# Patient Record
Sex: MALE | Race: OTHER
[De-identification: names, ages, dates, MRNs, and addresses within clinical notes are randomized per-mention and may not be internally consistent; named-entity substitution may affect disease eponyms.]

---

## 2019-12-22 ENCOUNTER — HOSPITAL ENCOUNTER (EMERGENCY)
Dept: HOSPITAL 54 - ER | Age: 29
Discharge: HOME | End: 2019-12-22
Payer: COMMERCIAL

## 2019-12-22 VITALS — SYSTOLIC BLOOD PRESSURE: 142 MMHG | DIASTOLIC BLOOD PRESSURE: 75 MMHG

## 2019-12-22 VITALS — HEIGHT: 61 IN | BODY MASS INDEX: 41.16 KG/M2 | WEIGHT: 218 LBS

## 2019-12-22 DIAGNOSIS — Z90.89: ICD-10-CM

## 2019-12-22 DIAGNOSIS — E86.0: ICD-10-CM

## 2019-12-22 DIAGNOSIS — F19.10: ICD-10-CM

## 2019-12-22 DIAGNOSIS — R07.89: Primary | ICD-10-CM

## 2019-12-22 DIAGNOSIS — F17.200: ICD-10-CM

## 2019-12-22 LAB
ALBUMIN SERPL BCP-MCNC: 3.9 G/DL (ref 3.4–5)
ALP SERPL-CCNC: 93 U/L (ref 46–116)
ALT SERPL W P-5'-P-CCNC: 40 U/L (ref 12–78)
AST SERPL W P-5'-P-CCNC: 21 U/L (ref 15–37)
BASOPHILS # BLD AUTO: 0 /CMM (ref 0–0.2)
BASOPHILS NFR BLD AUTO: 0.5 % (ref 0–2)
BILIRUB SERPL-MCNC: 0.9 MG/DL (ref 0.2–1)
BUN SERPL-MCNC: 15 MG/DL (ref 7–18)
CALCIUM SERPL-MCNC: 9 MG/DL (ref 8.5–10.1)
CHLORIDE SERPL-SCNC: 105 MMOL/L (ref 98–107)
CK SERPL-CCNC: 158 U/L (ref 39–308)
CO2 SERPL-SCNC: 26 MMOL/L (ref 21–32)
CREAT SERPL-MCNC: 1.2 MG/DL (ref 0.6–1.3)
EOSINOPHIL NFR BLD AUTO: 3.9 % (ref 0–6)
ETHANOL SERPL-MCNC: < 3 MG/DL (ref 0–0)
GLUCOSE SERPL-MCNC: 93 MG/DL (ref 74–106)
HCT VFR BLD AUTO: 44 % (ref 39–51)
HGB BLD-MCNC: 14.8 G/DL (ref 13.5–17.5)
LIPASE SERPL-CCNC: 80 U/L (ref 73–393)
LYMPHOCYTES NFR BLD AUTO: 2.4 /CMM (ref 0.8–4.8)
LYMPHOCYTES NFR BLD AUTO: 25.1 % (ref 20–44)
MCHC RBC AUTO-ENTMCNC: 34 G/DL (ref 31–36)
MCV RBC AUTO: 85 FL (ref 80–96)
MONOCYTES NFR BLD AUTO: 0.5 /CMM (ref 0.1–1.3)
MONOCYTES NFR BLD AUTO: 5.3 % (ref 2–12)
NEUTROPHILS # BLD AUTO: 6.3 /CMM (ref 1.8–8.9)
NEUTROPHILS NFR BLD AUTO: 65.2 % (ref 43–81)
PLATELET # BLD AUTO: 186 /CMM (ref 150–450)
POTASSIUM SERPL-SCNC: 3.9 MMOL/L (ref 3.5–5.1)
PROT SERPL-MCNC: 7.1 G/DL (ref 6.4–8.2)
RBC # BLD AUTO: 5.14 MIL/UL (ref 4.5–6)
SODIUM SERPL-SCNC: 142 MMOL/L (ref 136–145)
WBC NRBC COR # BLD AUTO: 9.6 K/UL (ref 4.3–11)

## 2019-12-22 PROCEDURE — 80307 DRUG TEST PRSMV CHEM ANLYZR: CPT

## 2019-12-22 PROCEDURE — 84484 ASSAY OF TROPONIN QUANT: CPT

## 2019-12-22 PROCEDURE — 71045 X-RAY EXAM CHEST 1 VIEW: CPT

## 2019-12-22 PROCEDURE — G0480 DRUG TEST DEF 1-7 CLASSES: HCPCS

## 2019-12-22 PROCEDURE — 82550 ASSAY OF CK (CPK): CPT

## 2019-12-22 PROCEDURE — 80305 DRUG TEST PRSMV DIR OPT OBS: CPT

## 2019-12-22 PROCEDURE — 83690 ASSAY OF LIPASE: CPT

## 2019-12-22 PROCEDURE — 80053 COMPREHEN METABOLIC PANEL: CPT

## 2019-12-22 PROCEDURE — 96374 THER/PROPH/DIAG INJ IV PUSH: CPT

## 2019-12-22 PROCEDURE — 36415 COLL VENOUS BLD VENIPUNCTURE: CPT

## 2019-12-22 PROCEDURE — 85025 COMPLETE CBC W/AUTO DIFF WBC: CPT

## 2019-12-22 PROCEDURE — 93005 ELECTROCARDIOGRAM TRACING: CPT

## 2019-12-22 PROCEDURE — 99284 EMERGENCY DEPT VISIT MOD MDM: CPT

## 2019-12-22 PROCEDURE — 96361 HYDRATE IV INFUSION ADD-ON: CPT

## 2019-12-22 NOTE — NUR
C/O LEFT SIDED CHEST PAIN X 3 DAYS DESCRIBES AS "TIGHT AND SHARP"CRYSTAL METH 
AND ALCOHOL LAST USE LAST WEEK, pt awake, alert, -sob, nad noted, vss, pending 
md mason